# Patient Record
Sex: MALE | Race: WHITE | NOT HISPANIC OR LATINO | URBAN - NONMETROPOLITAN AREA
[De-identification: names, ages, dates, MRNs, and addresses within clinical notes are randomized per-mention and may not be internally consistent; named-entity substitution may affect disease eponyms.]

---

## 2018-11-15 PROBLEM — Z98.890: Noted: 2018-11-15

## 2018-11-15 PROBLEM — Z98.42: Noted: 2018-11-15

## 2019-01-15 ENCOUNTER — IMPORTED ENCOUNTER (OUTPATIENT)
Dept: URBAN - NONMETROPOLITAN AREA CLINIC 1 | Facility: CLINIC | Age: 54
End: 2019-01-15

## 2019-01-15 PROCEDURE — 99024 POSTOP FOLLOW-UP VISIT: CPT

## 2019-01-15 NOTE — PATIENT DISCUSSION
PO CE OS 11/08/18 Standard and Baerveldt Tube shunt w/ Ripcord-  Discussed findings w/ pt today-  Signs/symptoms associated with possible changes discussed-  Stitch not removed today/cord not touched. -  Long hx of POAG w/ vein occulsion OU w/ tube shunt OS -  Continue Lotemax OS BID PRN (can use Prednisolone but don't like this as much due to increasing IOP possibility). -  IOP today noted at 10 OD and 17 OS checked by me. - At Dr. Delgado Malone yesterday they got IOP higher than 26 (checked x3). I do not see that elevation in our office today. -  Explained to the patient that Dr. Robinson Wyman was no longer with our practice as he recently moved to [place]. Will discuss his case urther with Dr. John Sanchez and see what he recommends at this time. May need consultation with new glaucoma specialist coming to our practice in February.- Continue to monitor closely explained to the patient in great detail. -  RTC 2 weeks for recheck (or per Dr. Julián Troncoso recommendation). Taper steroids continue combigan/ latanoprost if IOP remains low will NOT pull ripcord. .if eleavates Dr. John Sanchez will see patient.-----------------------------------------------------------------------------------NOTES FROM DR. Cristino Wyman and had a long discussion recommended clipping the loose suture at temporal conj to provide the patient more comfort. I clipped it so it was flush to the surface. - After patient's verbal consent suture was clipped at the slit lamp with scissors. - Asked pt to continue all postop medications as previously prescribed:  Cipro Combigan and Latanoprost as prescribed. 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 Will d/c Pred Forte and gave sample of Lotemax to see if this will work better. Patient agreed with plan and will follow postop instructions as listed.  - Patient has decreased vision but I feel it is due to the cataract in OD - Continue to monitor per Dr. Nika Vega previous recommendation------------------------------------------------------------------------------------1 month POV CE OS 11/08/18 Standard and Baerveldt Tube shunt w/ Ripcord- Discussed diagnosis in detail with patient - IOP is noted at 11 OD and 16 OS today. -Stop Cipro hold to the side-Use Lotemax until it is gone-Start back on Prednisolone Acet when lotemax is gone use BID OS-Continue GL drops OD- Suture hanging loosely at temporal today. -3 week POV PVM will determine then if he wants to pull the stich.; Dr's Notes: -  VF shows (+) progression OSMR  7/28/16DFE  7/28/16OCT  11/1/17Optos  1/26/16VF 11/1/17Gonio 8/24/18PACH

## 2019-01-29 ENCOUNTER — IMPORTED ENCOUNTER (OUTPATIENT)
Dept: URBAN - NONMETROPOLITAN AREA CLINIC 1 | Facility: CLINIC | Age: 54
End: 2019-01-29

## 2019-01-29 PROCEDURE — 99024 POSTOP FOLLOW-UP VISIT: CPT

## 2019-01-29 NOTE — PATIENT DISCUSSION
PO CE OS 11/08/18 Standard and Baerveldt Tube shunt w/ Ripcord-  Discussed findings w/ pt today-  Signs/symptoms associated with possible changes discussed-  Stitch not removed today/cord not touched. -  Long hx of POAG w/ vein occulsion OU w/ tube shunt OS -  Continue Lotemax OS BID PRN (can use Prednisolone but don't like this as much due to increasing IOP possibility). -  IOP today noted at 10 OD and 16 OS -  At Dr. Lisa Barcenas yesterday they got IOP higher than 26 (checked x3). I do not see that elevation in our office today. -  Explained to the patient that Dr. Bethany Ochoa was no longer with our practice as he recently moved to [place]. Will discuss his case urther with Dr. Bob Salinas and see what he recommends at this time. May need consultation with new glaucoma specialist coming to our practice in February.-  Continue to monitor closely explained to the patient in great detail. -  RTC: 2 Month f/u with DFE -D/C Lotemax for now if need be we will start patient back on Lotemax QD OD -Continue Combigan and Latanoprost. - If IOP remains low will NOT pull ripcord if eleavates Dr. Bob Salinas will see patient. - Patient agrees with above plan. -----------------------------------------------------------------------------------NOTES FROM DR. Nena Ochoa and had a long discussion recommended clipping the loose suture at temporal conj to provide the patient more comfort. I clipped it so it was flush to the surface. - After patient's verbal consent suture was clipped at the slit lamp with scissors. - Asked pt to continue all postop medications as previously prescribed:  Cipro Combigan and Latanoprost as prescribed. 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 Will d/c Pred Forte and gave sample of Lotemax to see if this will work better. Patient agreed with plan and will follow postop instructions as listed.  - Patient has decreased vision but I feel it is due to the cataract in OD - Continue to monitor per Dr. Tremayne Duncan previous recommendation------------------------------------------------------------------------------------1 month POV CE OS 11/08/18 Standard and Baerveldt Tube shunt w/ Ripcord- Discussed diagnosis in detail with patient - IOP is noted at 11 OD and 16 OS today. -Stop Cipro hold to the side-Use Lotemax until it is gone-Start back on Prednisolone Acet when lotemax is gone use BID OS-Continue GL drops OD- Suture hanging loosely at temporal today. -3 week POV PVM will determine then if he wants to pull the stich.; 's Notes: -  VF shows (+) progression OSMR  7/28/16DFE  7/28/16OCT  11/1/17Optos  1/26/16VF 11/1/17Gonio 8/24/18PACH

## 2020-02-14 ENCOUNTER — IMPORTED ENCOUNTER (OUTPATIENT)
Dept: URBAN - NONMETROPOLITAN AREA CLINIC 1 | Facility: CLINIC | Age: 55
End: 2020-02-14

## 2020-02-14 PROBLEM — H25.11: Noted: 2020-02-14

## 2020-02-14 PROBLEM — H40.1332: Noted: 2020-02-14

## 2020-02-14 PROBLEM — Z98.42: Noted: 2018-11-15

## 2020-02-14 PROCEDURE — 99213 OFFICE O/P EST LOW 20 MIN: CPT

## 2020-02-14 NOTE — PATIENT DISCUSSION
PO CE OS 11/08/18 Standard and Baerveldt Tube shunt w/ Ripcord-  Discussed findings w/ pt today-  Signs/symptoms associated with possible changes discussed-  Stitch not removed today/cord not touched. -  Long hx of POAG w/ vein occulsion OU w/ tube shunt OS -  Explained to the patient that Dr. Tal Li was no longer with our practice as he recently moved to [place]. Will discuss his case urther with Dr. Brenda Del Rio and see what he recommends at this time. May need consultation with new glaucoma specialist coming to our practice in February.-  Continue Combigan and Latanoprost. -  If IOP remains low will NOT pull ripcord if eleavates Dr. Brenda Del Rio will see patient. -  IOP today noted at 18 OU-  Patient has not been seen with us since 01/2019 but has been followed by Dr. Raj Lares. -  Cataract OD is becoming a issue will consider a referral to Dr. Brenda Del Rio pending Dr. Melissa Billingsley recommend ation. Patient has upcoming appoint this morning  -  Patient is currently using Latanoprost QHS OU and start Timolol QAM OU. Rx sent to pharmacy  -  Quick MR done today by Dr. Joan Mckay but will hold at this time -  Sample of Prolensa given today to use QD - BID -  Continue to monitor -----------------------------------------------------------------------------------NOTES FROM DR. Fantasma Li and had a long discussion recommended clipping the loose suture at temporal conj to provide the patient more comfort. I clipped it so it was flush to the surface. - After patient's verbal consent suture was clipped at the slit lamp with scissors. - Asked pt to continue all postop medications as previously prescribed:  Cipro Combigan and Latanoprost as prescribed. 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 Will d/c Pred Forte and gave sample of Lotemax to see if this will work better. Patient agreed with plan and will follow postop instructions as listed.  - Patient has decreased vision but I feel it is due to the cataract in OD - Continue to monitor per Dr. Viry Ray previous recommendation------------------------------------------------------------------------------------1 month POV CE OS 11/08/18 Standard and Baerveldt Tube shunt w/ Ripcord- Discussed diagnosis in detail with patient - IOP is noted at 11 OD and 16 OS today. -Stop Cipro hold to the side-Use Lotemax until it is gone-Start back on Prednisolone Acet when lotemax is gone use BID OS-Continue GL drops OD- Suture hanging loosely at temporal today. -3 week POV PVM will determine then if he wants to pull the stich.; Dr's Notes: -  VF shows (+) progression OSMR  7/28/16DFE  7/28/16OCT  11/1/17Optos  1/26/16VF 11/1/17Gonio 8/24/18PACH

## 2020-05-14 ENCOUNTER — IMPORTED ENCOUNTER (OUTPATIENT)
Dept: URBAN - NONMETROPOLITAN AREA CLINIC 1 | Facility: CLINIC | Age: 55
End: 2020-05-14

## 2020-05-14 PROCEDURE — 92083 EXTENDED VISUAL FIELD XM: CPT

## 2020-05-14 PROCEDURE — 99213 OFFICE O/P EST LOW 20 MIN: CPT

## 2020-05-14 PROCEDURE — 92133 CPTRZD OPH DX IMG PST SGM ON: CPT

## 2020-05-14 NOTE — PATIENT DISCUSSION
PO CE OS 11/08/18 Standard and Baerveldt Tube shunt w/ Ripcord-  Discussed findings w/ pt today-  Signs/symptoms associated with possible changes discussed-  Stitch not removed today/cord not touched. -  Long hx of POAG w/ vein occulsion OU w/ tube shunt OS -  Explained to the patient that Dr. Gin Vallejo was no longer with our practice as he recently moved to [place]. Will discuss his case urther with Dr. Ryan Geiger and see what he recommends at this time. May need consultation with new glaucoma specialist coming to our practice in February. -  If IOP remains low will NOT pull ripcord if eleavates Dr. Ryan Geiger will see patient. -  IOP today noted at OD 12 and OS 10-  Cataract OD is becoming a issue will consider a referral to Dr. Ryan Geiger pending Dr. Rachel Quinn recommend ation. Will wait on cataract eval at this time   -  Continue Latanoprost QHS OU and Timolol QAM OU. -  Continue Ketorolac PRN-  VF done today  OD shows Fair field with Borderline Superior Defect and OS shows Good field with Dense Superior Arcuate and Nasal step-  OCT done today OD shows Borderline Inferior NFL thinning and OS shows Inferior NFL thinning and Borderline superior NFL thinning -  Continue to monitor-  RTC Sept with Optos -----------------------------------------------------------------------------------NOTES FROM DR. Jonas Vallejo and had a long discussion recommended clipping the loose suture at temporal conj to provide the patient more comfort. I clipped it so it was flush to the surface. - After patient's verbal consent suture was clipped at the slit lamp with scissors. - Asked pt to continue all postop medications as previously prescribed:  Cipro Combigan and Latanoprost as prescribed. 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 Will d/c Pred Forte and gave sample of Lotemax to see if this will work better. Patient agreed with plan and will follow postop instructions as listed.  - Patient has decreased vision but I feel it is due to the cataract in OD - Continue to monitor per Dr. Rex Wiggins previous recommendation------------------------------------------------------------------------------------1 month POV CE OS 11/08/18 Standard and Baerveldt Tube shunt w/ Ripcord- Discussed diagnosis in detail with patient - IOP is noted at 11 OD and 16 OS today. -Stop Cipro hold to the side-Use Lotemax until it is gone-Start back on Prednisolone Acet when lotemax is gone use BID OS-Continue GL drops OD- Suture hanging loosely at temporal today. -3 week POV PVM will determine then if he wants to pull the stich.; Dr's Notes: -  VF shows (+) progression OSMR  7/28/16DFE  7/28/16OCT 5/14/20Optos  1/26/16VF 5/14/20Gonio 8/24/18PACH

## 2020-08-05 NOTE — PATIENT DISCUSSION
The IOP is in the target range with stable disc and IOP.  CPM.  disc photo today.  re-check in 1 year with HVF.

## 2020-09-17 ENCOUNTER — IMPORTED ENCOUNTER (OUTPATIENT)
Dept: URBAN - NONMETROPOLITAN AREA CLINIC 1 | Facility: CLINIC | Age: 55
End: 2020-09-17

## 2020-09-17 PROCEDURE — 99213 OFFICE O/P EST LOW 20 MIN: CPT

## 2020-09-17 PROCEDURE — 92250 FUNDUS PHOTOGRAPHY W/I&R: CPT

## 2020-09-17 NOTE — PATIENT DISCUSSION
Pigmentary Open Angle Glaucoma OU:- Discussed exam findings with patient today- OCT done previously no progression OU- Optos donetoday stable OU  - IOP OD 14 and OS 16- Cup to Disc noted at 0.7 OU. - [place] noted to be very thin. - Continue Latanoprost QHS OU and Timolol BID OU D/C Cosopt due to cost****Patient states that he had a avastain injection by Dr. Aiyana Reyes last week   - Continue to monitor- RTC 6 months F/U ------------------------------------------------------------------------------------PO CE OS 11/08/18 Standard and Baerveldt Tube shunt w/ Ripcord-  Discussed findings w/ pt today-  Signs/symptoms associated with possible changes discussed-  Stitch not removed today/cord not touched. -  Long hx of POAG w/ vein occulsion OU w/ tube shunt OS -  Explained to the patient that Dr. Connor Watt was no longer with our practice as he recently moved to [place]. Will discuss his case urther with Dr. Maverick Chinchilla and see what he recommends at this time. May need consultation with new glaucoma specialist coming to our practice in February. -  If IOP remains low will NOT pull ripcord if eleavates Dr. Maverick Chinchilla will see patient. -  IOP today noted at OD 12 and OS 10-  Cataract OD is becoming a issue will consider a referral to Dr. Maverick Chinchilla pending Dr. Jeanette Villalpando recommend ation. Will wait on cataract eval at this time   -  Continue Latanoprost QHS OU and Timolol QAM OU. -  Continue Ketorolac PRN-  VF done today  OD shows Fair field with Borderline Superior Defect and OS shows Good field with Dense Superior Arcuate and Nasal step-  OCT done today OD shows Borderline Inferior NFL thinning and OS shows Inferior NFL thinning and Borderline superior NFL thinning -  Continue to monitor-  RTC Sept with Optos -----------------------------------------------------------------------------------NOTES FROM DR. Zo Watt and had a long discussion recommended clipping the loose suture at temporal conj to provide the patient more comfort. I clipped it so it was flush to the surface. - After patient's verbal consent suture was clipped at the slit lamp with scissors. - Asked pt to continue all postop medications as previously prescribed:  Cipro Combigan and Latanoprost as prescribed. 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 Will d/c Pred Forte and gave sample of Lotemax to see if this will work better. Patient agreed with plan and will follow postop instructions as listed.  - Patient has decreased vision but I feel it is due to the cataract in OD - Continue to monitor per Dr. Prieto Cloud previous recommendation; Dr's Notes: -  VF shows (+) progression OSMR  7/28/16DFE  7/28/16OCT 5/14/20Optos 9/17/20VF 5/14/20Gonio 8/24/18PACH

## 2021-03-17 ENCOUNTER — IMPORTED ENCOUNTER (OUTPATIENT)
Dept: URBAN - NONMETROPOLITAN AREA CLINIC 1 | Facility: CLINIC | Age: 56
End: 2021-03-17

## 2021-03-17 PROBLEM — H25.11: Noted: 2021-03-17

## 2021-03-17 PROBLEM — H35.81: Noted: 2021-03-17

## 2021-03-17 PROBLEM — H35.372: Noted: 2021-03-17

## 2021-03-17 PROBLEM — H40.1332: Noted: 2021-03-17

## 2021-03-17 PROCEDURE — 92134 CPTRZ OPH DX IMG PST SGM RTA: CPT

## 2021-03-17 PROCEDURE — 99214 OFFICE O/P EST MOD 30 MIN: CPT

## 2021-03-17 NOTE — PATIENT DISCUSSION
Pigmentary Open Angle Glaucoma OU:- Discussed exam findings with patient today- OCT done previously no progression OU- Optos done previously stable OU  - IOP OD 14 and OS 14- Cup to Disc noted at 0.7 OU. - [place] noted to be very thin. - Continue Latanoprost QHS OU and Timolol BID OU D/C Cosopt due to cost- Continue to monitorERM OS with Retinal Edema OS - Discussed diagnosis in detail with patient - Discussed signs and symptoms of progression - OCT done today OD WNL and OS shows ERM with edema but stablefrom previous - Patient is being followed by Dr. Ba Manzanares and currently getting injections in OS - Continue to monitor [place] OD- Discussed diagnosis in detail with patient- Discussed signs and symptoms of progression- Discussed UV protection- Patient has noticed trouble with glare - Recommend cataract eval with Dr. Lissette Ellison patient agrees with plan - Continue to monitor------------------------------------------------------------------------------------PO CE OS 11/08/18 Standard and Baerveldt Tube shunt w/ Ripcord-  Discussed findings w/ pt today-  Signs/symptoms associated with possible changes discussed-  Stitch not removed today/cord not touched. -  Long hx of POAG w/ vein occulsion OU w/ tube shunt OS -  If IOP remains low will NOT pull ripcord-  IOP today noted 14 OU-  Cataract OD is becoming a issue will consider a referral to Dr. Lissette Ellison pending Dr. Vinay Duran recommend ation. Will wait on cataract eval at this time   -  Continue Latanoprost QHS OU and Timolol QAM OU. -  Continue Ketorolac PRN-  VF done today  OD shows Fair field with Borderline Superior Defect and OS shows Good field with Dense Superior Arcuate and Nasal step-  OCT done today OD shows Borderline Inferior NFL thinning and OS shows Inferior NFL thinning and Borderline superior NFL thinning -  Continue to monitor-  RTC Sept with Optos -----------------------------------------------------------------------------------NOTES FROM DR. Aria Calero and had a long discussion recommended clipping the loose suture at temporal conj to provide the patient more comfort. I clipped it so it was flush to the surface. - After patient's verbal consent suture was clipped at the slit lamp with scissors. - Asked pt to continue all postop medications as previously prescribed:  Cipro Combigan and Latanoprost as prescribed. 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 Will d/c Pred Forte and gave sample of Lotemax to see if this will work better. Patient agreed with plan and will follow postop instructions as listed.  - Patient has decreased vision but I feel it is due to the cataract in OD - Continue to monitor per Dr. Agustin Rossi previous recommendation; Dr's Notes: -  VF shows (+) progression OSMR  7/28/16DFE  7/28/16OCT 3/17/21 MAC Optos 9/17/20VF 5/14/20Gonio 8/24/18PACH

## 2021-05-05 ENCOUNTER — IMPORTED ENCOUNTER (OUTPATIENT)
Dept: URBAN - NONMETROPOLITAN AREA CLINIC 1 | Facility: CLINIC | Age: 56
End: 2021-05-05

## 2021-05-05 PROBLEM — I10: Noted: 2021-05-05

## 2021-05-05 PROBLEM — H40.1332: Noted: 2021-05-05

## 2021-05-05 PROBLEM — H35.81: Noted: 2021-05-05

## 2021-05-05 PROBLEM — H25.11: Noted: 2021-05-05

## 2021-05-05 PROBLEM — H35.372: Noted: 2021-05-05

## 2021-05-05 PROBLEM — E78.5: Noted: 2021-05-05

## 2021-05-05 PROBLEM — Z01.818: Noted: 2021-05-05

## 2021-05-05 PROCEDURE — 99214 OFFICE O/P EST MOD 30 MIN: CPT

## 2021-05-05 NOTE — PATIENT DISCUSSION
Medical Clearance-Medical clearance done today. -No outstanding concerns that would preclude surgery.-Patient is cleared to proceed with scheduled surgery.; Dr's Notes: -  VF shows (+) progression OSMR  7/28/16DFE  7/28/16OCT 3/17/21 MAC Optos 9/17/20VF 5/14/20Gonio 8/24/18PACH

## 2021-05-05 NOTE — PATIENT DISCUSSION
Cataract(s)-Visually significant cataract OD . -Cataract(s) causing symptomatic impairment of visual function not correctable with a tolerable change in glasses or contact lenses lighting or non-operative means resulting in specific activity limitations and/or participation restrictions including but not limited to reading viewing television driving or meeting vocational or recreational needs. -Expectation is clearer vision and functional improvement in symptoms as well as reduced glare disability after cataract removal.-Order IOLMaster and OPD today. -Recommend   Limbal Relaxing Incisions based on today's OPD testing and lifestyle questionnaire.-All questions were answered regarding surgery including pre and post-op medications appointments activity restrictions and anesthetic usage.-The risks benefits and alternatives and special risk factors for the patient were discussed in detail including but not limited to: bleeding infection retinal detachment vitreous loss problems with the implant and possible need for additional surgery.-Although rare the possibility of complete vision loss was discussed.-The possible need for glasses post-operatively was discussed.-Order medical clearance exam based on history of -Patient elects to proceed with cataract surgery OD . Discussed all lens w/ patient. Pt elects LenSx. qualifies for LRI and explained lens benefits. Discussed he will need glasses for reading with OD but explained he does need glasses currently for OS and he will still need them Pigmentary Open Angle Glaucoma OU:- Discussed exam findings with patient today- OCT done previously no progression OU- Optos done previously stable OU  - IOP stable - Cup to Disc noted at 0.7 OU. - [place] noted to be very thin.  - Continue Latanoprost QHS OU and Timolol BID OU D/C Cosopt due to cost- Continue to monitorERM OS with Retinal Edema OS - Discussed diagnosis in detail with patient - Discussed signs and symptoms of progression - OCT done today OD WNL and OS shows ERM with edema but stablefrom previous - Patient is being followed by Dr. Rell Juarez and currently getting injections in OS - Continue to monitor Pseudophakia OS s/p Baeveldt Tube Shunt w/ Ripcord PO CE OS 11/08/18 Standard and Baerveldt Tube shunt w/ Ripcord-  Discussed findings w/ pt today-  Signs/symptoms associated with possible changes discussed- stable and continue to monitor w/ no treatment needed; Dr's Notes: -  VF shows (+) progression OSMR  7/28/16DFE  7/28/16OCT 3/17/21 MAC Optos 9/17/20VF 5/14/20Gonio 8/24/18PACH

## 2021-05-13 ENCOUNTER — IMPORTED ENCOUNTER (OUTPATIENT)
Dept: URBAN - NONMETROPOLITAN AREA CLINIC 1 | Facility: CLINIC | Age: 56
End: 2021-05-13

## 2021-05-13 PROBLEM — Z98.41: Noted: 2021-05-13

## 2021-05-13 PROCEDURE — 99024 POSTOP FOLLOW-UP VISIT: CPT

## 2021-05-13 NOTE — PATIENT DISCUSSION
1 Day POV CE OD 5/12/21 LenSx w/ LRI - Discussed diagnosis in detail with patient- Patient is stable and doing well- Wound intact- Continue all post op drops as directed- Pressure OD 32 burpt to 18- Samples of Besivance given today - Continue to monitor- RTC 1 week POV; 's Notes: -  VF shows (+) progression OSMR  7/28/16DFE  7/28/16OCT 3/17/21 MAC Optos 9/17/20VF 5/14/20Gonio 8/24/18PACH

## 2021-05-18 ENCOUNTER — IMPORTED ENCOUNTER (OUTPATIENT)
Dept: URBAN - NONMETROPOLITAN AREA CLINIC 1 | Facility: CLINIC | Age: 56
End: 2021-05-18

## 2021-05-18 PROCEDURE — 99024 POSTOP FOLLOW-UP VISIT: CPT

## 2021-05-18 NOTE — PATIENT DISCUSSION
1 week POV CE OD 5/12/21 LenSx w/ LRI - Discussed diagnosis in detail with patient- Wound intact- Continue all post op drops as directed- Continue to monitor- RTC 3 weeks POV MR; 's Notes: -  VF shows (+) progression OSMR  7/28/16DFE  7/28/16OCT 3/17/21 MAC Optos 9/17/20VF 5/14/20Gonio 8/24/18PACH

## 2021-06-08 ENCOUNTER — IMPORTED ENCOUNTER (OUTPATIENT)
Dept: URBAN - NONMETROPOLITAN AREA CLINIC 1 | Facility: CLINIC | Age: 56
End: 2021-06-08

## 2021-06-08 PROCEDURE — 99024 POSTOP FOLLOW-UP VISIT: CPT

## 2021-06-08 NOTE — PATIENT DISCUSSION
1 month POV CE OD 5/12/21 LenSx w/ LRI - Discussed diagnosis in detail with patient- Wound intact- MR done today by Dr. Idolina Nissen and new glasses RX given- Continue to monitor- RTC 3 months Pseudophakia; 's Notes: -  VF shows (+) progression OSMR  7/28/16DFE  7/28/16OCT 3/17/21 MAC Optos 9/17/20VF 5/14/20Gonio 8/24/18PACH

## 2021-08-30 NOTE — PATIENT DISCUSSION
8/30/21:.  IOP is up a bit OS today.  re-check at next visit with HVF.  If remains elevated may need to add SLT but [facility] does look stable OU.

## 2021-09-27 NOTE — PATIENT DISCUSSION
8/30/21:.  IOP is up a bit OS today.  re-check at next visit with HVF.  If remains elevated may need to add SLT but [facility] 74 does look stable OU.

## 2021-09-27 NOTE — PATIENT DISCUSSION
9/27/21: HVF is stable with only inc PBS OU.  ed this means eyes are tolerating IOP over time as no sig change in HVF in last few years.  CPM.

## 2021-10-12 NOTE — PATIENT DISCUSSION
Retinal tear and detachment warning symptoms reviewed and patient instructed to call immediately if increasing floaters, flashes, or decreasing peripheral vision. no

## 2022-04-09 ASSESSMENT — VISUAL ACUITY
OS_CC: 20/200
OS_CC: 20/100-
OD_CC: 20/40
OD_CC: 20/25
OS_CC: 20/100-
OD_GLARE: 20/100
OD_CC: 20/25-1
OD_CC: 20/30
OS_PH: 20/100-
OD_PH: 20/29-
OS_SC: 20/200
OS_GLARE: 20/400-
OS_SC: 20/100-
OS_PH: 20/100+
OD_PH: 20/30-
OD_GLARE: 20/100
OD_CC: 20/50
OS_CC: 20/125
OD_SC: 20/20-
OD_CC: 20/30+
OD_GLARE: 20/40
OS_CC: 20/125
OD_SC: 20/50-
OS_CC: 20/70
OD_CC: 20/29+
OD_PAM: 20/20
OS_CC: 20/200
OS_CC: 20/200
OS_GLARE: 20/200-
OD_CC: 20/40+
OD_CC: 20/30
OD_PH: 20/29
OS_AM: 20/30
OS_CC: 20/100-

## 2022-04-09 ASSESSMENT — PACHYMETRY
OS_CT_UM: 503; ADJ: VTHIN
OS_CT_UM: 503; ADJ: VTHIN
OD_CT_UM: 496; ADJ: VTHIN
OS_CT_UM: 503; ADJ: VTHIN
OD_CT_UM: 496; ADJ: VTHIN
OS_CT_UM: 503; ADJ: VTHIN
OS_CT_UM: 503; ADJ: VTHIN
OD_CT_UM: 496; ADJ: VTHIN
OD_CT_UM: 496; ADJ: VTHIN
OS_CT_UM: 503; ADJ: VTHIN
OD_CT_UM: 496; ADJ: VTHIN
OS_CT_UM: 503; ADJ: VTHIN
OD_CT_UM: 496; ADJ: VTHIN
OS_CT_UM: 503; ADJ: VTHIN
OS_CT_UM: 503; ADJ: VTHIN
OD_CT_UM: 496; ADJ: VTHIN

## 2022-04-09 ASSESSMENT — TONOMETRY
OD_IOP_MMHG: 10
OS_IOP_MMHG: 10
OD_IOP_MMHG: 18
OD_IOP_MMHG: 18
OS_IOP_MMHG: 10
OD_IOP_MMHG: 12
OD_IOP_MMHG: 18
OD_IOP_MMHG: 14
OS_IOP_MMHG: 14
OD_IOP_MMHG: 32
OD_IOP_MMHG: 10
OS_IOP_MMHG: 16
OS_IOP_MMHG: 16
OS_IOP_MMHG: 18
OS_IOP_MMHG: 14
OD_IOP_MMHG: 14
OS_IOP_MMHG: 16
OD_IOP_MMHG: 14
OD_IOP_MMHG: 16
OS_IOP_MMHG: 24
OS_IOP_MMHG: 17

## 2022-08-29 NOTE — PATIENT DISCUSSION
8/29/2021:  IOP too high OS today.  ed RBA to Tx plan add another drop OS vs SLT with JWK OS.  will try to change to Lg first and see if we can get better control with another drop on monotherapy.

## 2022-09-26 NOTE — PATIENT DISCUSSION
8/29/2022:  IOP too high OS today.  ed RBA to Tx plan add another drop OS vs SLT with JWK OS.  will try to change to Lg first and see if we can get better control with another drop on monotherapy.

## 2022-09-26 NOTE — PATIENT DISCUSSION
9/26/2022:  IOP better today OS on Lg monotherapy.  this is medically necessary to keep IOP down.  goal is 20 or less all visits.  Rx given for Lg and use QHS OU now.  IF Lg is too expensive may need to go back to latanoprost and add SLT or a second medication.

## 2024-03-23 NOTE — PATIENT DISCUSSION
Continue current management.
Glasses Rx given.
Good postoperative appearance.
Monitor.
No Retinal holes, Tears or Detachments.
Observe.
Patient is doing well following YAG capsulotomy. Signs and symptoms of retinal detachment including flashing and floaters were discussed. Patient was asked to schedule yearly preventative follow-up eye exams with us.
Patient understands condition, prognosis and need for follow up care.
Retinal tear and detachment warning symptoms reviewed and patient instructed to call immediately if increasing floaters, flashes, or decreasing peripheral vision.
Stable.
The IOP is in the target range with stable disc and IOP.  CPM.
SCM
